# Patient Record
(demographics unavailable — no encounter records)

---

## 2024-11-29 NOTE — HISTORY OF PRESENT ILLNESS
[de-identified] : fever [FreeTextEntry6] : 8 year old with fever x 3 days cough, runny nose exposed to strep and flu is flu vaccinated

## 2024-11-29 NOTE — DISCUSSION/SUMMARY
[FreeTextEntry1] : Child's presentation is most consistent with the flu. Therefore, I advised patient to remain home for 24 hours beyond last fever. Reviewed strict hand washing to prevent spread of the flu. is vaccinated too late for antiviral  Patient likely with viral pharyngitis. Rapid strep performed in office is negative. Will send throat culture to rule out strep. Recommend supportive care with antipyretics, salt water gargles, and if age-appropriate throat lozenges.

## 2024-11-29 NOTE — REVIEW OF SYSTEMS
[Fever] : fever [Chills] : chills [Nasal Discharge] : nasal discharge [Nasal Congestion] : nasal congestion [Sore Throat] : sore throat [Cough] : cough

## 2025-01-14 NOTE — REASON FOR VISIT
[Behavioral Health Urgent Care Assessment] : a behavioral health urgent care assessment [Patient] : patient [Mother] : mother [Self] : alone [TextBox_17] : connection to care

## 2025-01-14 NOTE — RISK ASSESSMENT
[Clinical Interview] : Clinical Interview [No] : No [No known suicide factors] : No known suicide factors [Supportive social network of family or friends] : supportive social network of family or friends [Engaged in work or school] : engaged in work or school [No known risk factors] : No known risk factors [Residential stability] : residential stability [Relationship stability] : relationship stability [Affective stability] : affective stability [Sobriety] : sobriety [Yes] : yes [None known] : None known [None in the patient's lifetime] : None in the patient's lifetime [None Known] : none known [de-identified] : no access

## 2025-01-14 NOTE — PHYSICAL EXAM
[Normal] : normal [None] : none [Cooperative] : cooperative [Euthymic] : euthymic [Clear] : clear [Linear/Goal Directed] : linear/goal directed [Average] : average [WNL] : within normal limits [Positive interaction] : positive interaction [Unremarkable/age appropriate] : unremarkable/age appropriate [FreeTextEntry5] : kevin [de-identified] : neutral, full, stable

## 2025-01-14 NOTE — PLAN
[Provision of National Suicide Prevention Lifeline 9-357-256-TALK (7711)] : Provision of national suicide prevention lifeline 1-656-593-talk (2733) [Education provided regarding environmental safety/ lethal means restriction] : Education provided regarding environmental safety/ lethal means restriction [Contact was Attempted] : contact was attempted [None on Record] : none on record [TextBox_9] : linkage to therapy [TextBox_11] : n/a [TextBox_13] : not clinically indicated [TextBox_26] : Attempted to reach number provided for

## 2025-01-14 NOTE — HISTORY OF PRESENT ILLNESS
[Not Applicable] : Not applicable [FreeTextEntry1] : Pt is a 7 y/o girl in 3rd grade at [employer], domiciled with family w/ no PPH, no past inpt admissions, no past suicide attempts or SIB, no PMH, no substance use and no hx of abuse, BIB mother for evaluation of anxiety and difficulty going to school after Oanh break.  Pt presented calm and cooperative w/ appropriate affect. Reports she feels "upset" in school and it feels "too long". This difficulty started right after Oan[employer] break. She wakes up feeling upset because she has to go to school. Cries to the point of feeling sick to the stomach (occasional emesis). Has been able to get to school despite difficulty. Once in school, she says she doesn't cry and can focus/participate in class. No changes in sleep or appetite. Able to enjoy books she's reading, "Salena Bridges". Admits to worrying but unable to provide further details. Denies bullying in school. Denies difficulty with academics or socializations. Would rather be home and read books.    Denied manic/psychotic symptoms. Denied current or past SI/HI, plan or intent. Denied urges to harm self or others. Denied aggressive ideations.  Collateral from mother reports similar. Notes increased difficulty attending school since return to school after winter break. Each morning she wakes up and cries until she throws up. Still able to make it to school despite, and no issues in school once she's there. Teachers have noticed increased participation each day. Patient reportedly had "good day" yesterday. Mom has not noticed depressed and anxious mood previously. Had similar episodes 1 year ago after winter break as well, but only occurred a handful of times in the beginning of semester. No issues in school per teachers. She's well-liked and is overall a great student. At baseline described as "happy, silly". Is interested in getting connected to therapy.  [FreeTextEntry2] : n/a [FreeTextEntry3] : n/a

## 2025-01-14 NOTE — DISCUSSION/SUMMARY
[Low acute suicide risk] : Low acute suicide risk [No] : No [Not clinically indicated] : Safety Plan completed/updated (for individuals at risk): Not clinically indicated [FreeTextEntry1] : protective factors: stable domicile, engaged in school, denies suicidal ideation, no history of suicidality, reports reason for living acute risk; increase in depressive/anxiety symptoms, not in treatment

## 2025-01-14 NOTE — SOCIAL HISTORY
[No Known Substance Use] : no known substance use [FreeTextEntry1] : lives with parents and three siblings. 4th grader at [employer]. Likes reading and math. plays multiple sports (soccer, lacrosse, basketball etc)

## 2025-04-01 NOTE — REVIEW OF SYSTEMS
[Fever] : fever [Headache] : headache [Nasal Discharge] : nasal discharge [Nasal Congestion] : nasal congestion [Sore Throat] : sore throat [Cough] : cough [Negative] : Heme/Lymph [Eye Discharge] : no eye discharge [Eye Redness] : no eye redness [Ear Pain] : no ear pain [Tachypnea] : not tachypneic

## 2025-04-01 NOTE — PHYSICAL EXAM
[Alert] : alert [Clear] : right tympanic membrane clear [Clear Rhinorrhea] : clear rhinorrhea [Clear to Auscultation Bilaterally] : clear to auscultation bilaterally [NL] : warm, clear [Acute Distress] : no acute distress [Conjuctival Injection] : no conjunctival injection [Discharge] : no discharge [Eyelid Swelling] : no eyelid swelling [Erythematous Oropharynx] : nonerythematous oropharynx